# Patient Record
Sex: MALE | URBAN - METROPOLITAN AREA
[De-identification: names, ages, dates, MRNs, and addresses within clinical notes are randomized per-mention and may not be internally consistent; named-entity substitution may affect disease eponyms.]

---

## 2023-11-01 ENCOUNTER — APPOINTMENT (RX ONLY)
Dept: URBAN - METROPOLITAN AREA CLINIC 329 | Facility: CLINIC | Age: 63
Setting detail: DERMATOLOGY
End: 2023-11-01

## 2023-11-01 DIAGNOSIS — D485 NEOPLASM OF UNCERTAIN BEHAVIOR OF SKIN: ICD-10-CM

## 2023-11-01 PROBLEM — D48.5 NEOPLASM OF UNCERTAIN BEHAVIOR OF SKIN: Status: ACTIVE | Noted: 2023-11-01

## 2023-11-01 PROCEDURE — ? BIOPSY BY SHAVE METHOD

## 2023-11-01 PROCEDURE — 11102 TANGNTL BX SKIN SINGLE LES: CPT

## 2023-11-01 PROCEDURE — ? COUNSELING

## 2023-11-01 ASSESSMENT — LOCATION ZONE DERM: LOCATION ZONE: FACE

## 2023-11-01 ASSESSMENT — LOCATION DETAILED DESCRIPTION DERM: LOCATION DETAILED: LEFT INFERIOR LATERAL FOREHEAD

## 2023-11-01 ASSESSMENT — LOCATION SIMPLE DESCRIPTION DERM: LOCATION SIMPLE: LEFT FOREHEAD

## 2023-11-01 NOTE — PROCEDURE: MIPS QUALITY
Quality 110: Preventive Care And Screening: Influenza Immunization: Influenza Immunization Administered during Influenza season
Quality 431: Preventive Care And Screening: Unhealthy Alcohol Use - Screening: Patient not identified as an unhealthy alcohol user when screened for unhealthy alcohol use using a systematic screening method
Quality 226: Preventive Care And Screening: Tobacco Use: Screening And Cessation Intervention: Tobacco Screening not Performed
Detail Level: Zone

## 2023-11-01 NOTE — PROCEDURE: BIOPSY BY SHAVE METHOD
Detail Level: Detailed
Depth Of Biopsy: dermis
Was A Bandage Applied: Yes
Size Of Lesion In Cm: 1.5
X Size Of Lesion In Cm: 0
Biopsy Type: H and E
Biopsy Method: Dermablade
Anesthesia Type: 1% lidocaine with epinephrine
Anesthesia Volume In Cc: 0.5
Hemostasis: Drysol
Wound Care: Petrolatum
Dressing: bandage
Destruction After The Procedure: No
Type Of Destruction Used: Curettage
Curettage Text: The wound bed was treated with curettage after the biopsy was performed.
Cryotherapy Text: The wound bed was treated with cryotherapy after the biopsy was performed.
Electrodesiccation Text: The wound bed was treated with electrodesiccation after the biopsy was performed.
Electrodesiccation And Curettage Text: The wound bed was treated with electrodesiccation and curettage after the biopsy was performed.
Silver Nitrate Text: The wound bed was treated with silver nitrate after the biopsy was performed.
Lab: 6
Lab Facility: [id number]
Consent: Written consent was obtained and risks were reviewed including but not limited to scarring, infection, bleeding, scabbing, incomplete removal, nerve damage and allergy to anesthesia.
Post-Care Instructions: I reviewed with the patient in detail post-care instructions. Patient is to keep the biopsy site dry overnight, and then apply bacitracin twice daily until healed. Patient may apply hydrogen peroxide soaks to remove any crusting.
Notification Instructions: Patient will be notified of biopsy results. However, patient instructed to call the office if not contacted within 2 weeks.
Billing Type: Third-Party Bill
Information: Selecting Yes will display possible errors in your note based on the variables you have selected. This validation is only offered as a suggestion for you. PLEASE NOTE THAT THE VALIDATION TEXT WILL BE REMOVED WHEN YOU FINALIZE YOUR NOTE. IF YOU WANT TO FAX A PRELIMINARY NOTE YOU WILL NEED TO TOGGLE THIS TO 'NO' IF YOU DO NOT WANT IT IN YOUR FAXED NOTE.

## 2024-04-01 ENCOUNTER — OFFICE VISIT (OUTPATIENT)
Dept: ORTHOPEDIC SURGERY | Age: 64
End: 2024-04-01
Payer: COMMERCIAL

## 2024-04-01 VITALS — HEIGHT: 72 IN | BODY MASS INDEX: 35.21 KG/M2 | WEIGHT: 260 LBS

## 2024-04-01 DIAGNOSIS — M51.36 DDD (DEGENERATIVE DISC DISEASE), LUMBAR: ICD-10-CM

## 2024-04-01 DIAGNOSIS — M54.50 LOW BACK PAIN, UNSPECIFIED BACK PAIN LATERALITY, UNSPECIFIED CHRONICITY, UNSPECIFIED WHETHER SCIATICA PRESENT: Primary | ICD-10-CM

## 2024-04-01 DIAGNOSIS — M54.16 LUMBAR RADICULOPATHY: ICD-10-CM

## 2024-04-01 DIAGNOSIS — R29.818 NEUROGENIC CLAUDICATION: ICD-10-CM

## 2024-04-01 DIAGNOSIS — M47.816 FACET ARTHROPATHY, LUMBAR: ICD-10-CM

## 2024-04-01 PROCEDURE — 1123F ACP DISCUSS/DSCN MKR DOCD: CPT | Performed by: PHYSICIAN ASSISTANT

## 2024-04-01 PROCEDURE — 99204 OFFICE O/P NEW MOD 45 MIN: CPT | Performed by: PHYSICIAN ASSISTANT

## 2024-04-01 RX ORDER — MELOXICAM 15 MG/1
15 TABLET ORAL DAILY
Qty: 30 TABLET | Refills: 0 | Status: SHIPPED | OUTPATIENT
Start: 2024-04-01 | End: 2024-05-01

## 2024-04-01 RX ORDER — FLUTICASONE PROPIONATE 50 MCG
SPRAY, SUSPENSION (ML) NASAL
COMMUNITY

## 2024-04-01 RX ORDER — GABAPENTIN 100 MG/1
CAPSULE ORAL
COMMUNITY
End: 2024-04-01 | Stop reason: SDUPTHER

## 2024-04-01 RX ORDER — SILDENAFIL 25 MG/1
TABLET, FILM COATED ORAL
COMMUNITY

## 2024-04-01 RX ORDER — LORATADINE 10 MG/1
1 TABLET ORAL DAILY
COMMUNITY
Start: 2023-06-22

## 2024-04-01 RX ORDER — QUETIAPINE FUMARATE 50 MG/1
TABLET, FILM COATED ORAL
COMMUNITY

## 2024-04-01 RX ORDER — LOSARTAN POTASSIUM 100 MG/1
1 TABLET ORAL DAILY
COMMUNITY

## 2024-04-01 RX ORDER — CYCLOBENZAPRINE HCL 5 MG
TABLET ORAL
COMMUNITY

## 2024-04-01 RX ORDER — ATORVASTATIN CALCIUM 40 MG/1
1 TABLET, FILM COATED ORAL DAILY
COMMUNITY

## 2024-04-01 RX ORDER — GABAPENTIN 100 MG/1
200 CAPSULE ORAL 3 TIMES DAILY
Qty: 180 CAPSULE | Refills: 0 | Status: SHIPPED | OUTPATIENT
Start: 2024-04-01 | End: 2024-05-01

## 2024-04-01 RX ORDER — HYDROCHLOROTHIAZIDE 25 MG/1
TABLET ORAL
COMMUNITY
Start: 2023-10-24

## 2024-04-01 NOTE — PROGRESS NOTES
Name: Nacho Ashby  YOB: 1960  Gender: male  MRN: [id number]    CC: Back Pain (Low back pain that radiates into buttocks)       HPI: This is a 64 y.o. year old male who reports chronic history of lower back pain with radiation into bilateral buttocks posterior legs that usually occurs with standing and walking however he has had increase in particularly left lateral hip pain and buttock pain with radiation in the left lateral hip.  His PCP has prescribed cyclobenzaprine, meloxicam, gabapentin and ibuprofen.  He was advised not to take Beck back and body.  Pain is usually worse with standing and walking.  Denies  numbness or tingling.    History was obtained by patient    This patient  has not had lumbar surgery in the past.            4/1/2024    10:38 AM   AMB PAIN ASSESSMENT   Location of Pain Back    Location Modifiers Right;Left   Severity of Pain 9   Quality of Pain Aching;Sharp   Duration of Pain Persistent   Frequency of Pain Intermittent   Date Pain First Started 7/16/2004   Aggravating Factors Walking;Standing;Other (Comment)    Limiting Behavior Some   Relieving Factors Other (Comment);Nsaids    Result of Injury No   Work-Related Injury No   Are there other pain locations you wish to document? No       Significant value            ROS/Meds/PSH/PMH/FH/SH: I personally reviewed the patient's collected intake data.  Below are the pertinents:    No Known Allergies      Current Outpatient Medications:     atorvastatin (LIPITOR) 40 MG tablet, Take 1 tablet by mouth daily, Disp: , Rfl:     cyclobenzaprine (FLEXERIL) 5 MG tablet, Take 1 tablet twice a day by oral route as needed., Disp: , Rfl:     fluticasone (FLONASE) 50 MCG/ACT nasal spray, Spray 1 spray every day by intranasal route., Disp: , Rfl:     hydroCHLOROthiazide (HYDRODIURIL) 25 MG tablet, Take 1 tablet every day by oral route in the morning., Disp: , Rfl:     loratadine (CLARITIN) 10 MG tablet, Take 1 tablet by mouth daily, Disp:

## 2024-04-15 ENCOUNTER — HOSPITAL ENCOUNTER (OUTPATIENT)
Dept: PHYSICAL THERAPY | Age: 64
Setting detail: RECURRING SERIES
Discharge: HOME | End: 2024-04-18
Payer: MEDICARE

## 2024-04-15 DIAGNOSIS — R29.3 ABNORMAL POSTURE: Primary | ICD-10-CM

## 2024-04-15 DIAGNOSIS — M62.81 MUSCLE WEAKNESS (GENERALIZED): ICD-10-CM

## 2024-04-15 DIAGNOSIS — M54.59 OTHER LOW BACK PAIN: ICD-10-CM

## 2024-04-15 PROCEDURE — 97161 PT EVAL LOW COMPLEX 20 MIN: CPT

## 2024-04-15 PROCEDURE — 97530 THERAPEUTIC ACTIVITIES: CPT

## 2024-04-15 ASSESSMENT — PAIN SCALES - GENERAL: PAINLEVEL_OUTOF10: 3

## 2024-04-15 NOTE — THERAPY EVALUATION
WFL    Hip Internal Rotation Right: WNL  Left: WNL    Hip External Rotation Right: WNL  Left: WNL    Hamstring Length at 90* Hip Flexion Right: mild restrictions  Left: moderate restrictions    Piriformis Flexibility Right: significant restrictions  Left: significant restrictions      STRENGTH Date:   4/15/24 Date:      RIGHT LEFT RIGHT LEFT   Hip Flexion 5/5 5/5     Hip Extension 4+/5, initiates with lumbar extensors 4+/5, initiates with lumbar extensors     Hip Abduction 5/5 5/5     Hip Internal Rotation 5/5 5/5     Hip External Rotation 5/5 5/5     Knee Extension 5/5 5/5     Knee Flexion 5/5 5/5     Ankle Dorsiflexion 5/5 5/5     Ankle Plantarflexion 5/5 5/5       MOBILITY Date:   4/15/24 Date:    Bed mobility Rolling: Independent  Supine to Sit: Independent  Sit to Supine: IndependentIndependent  Scooting: Independent    Transfers Sit to Stand: Independent  Stand to Sit: Independent    Gait Gait Abnormalities: increased forward trunk lean and slower gait speed  Ambulation - Independent         FUNCTIONAL MOVEMENTS  MOBILITY Date:   4/15/24 Date:    Squatting Gait Abnormalities: NT  Goblet Squat: NT    Lunging Fwd Lunges with return to neutral: NT       Abdominal Activation/Strength TrA Firing with Hook Lying PPT: unable without significant cueing  TrA Firing with Hook Lying Marching: NT  Knee Fallouts in Hook Lying: NT  Modified Supine Leg Extensions: NT  SLR: NT  90/90 Holds: NT  90/90 Heel Taps: NT  Dead Bug (LE only): NT  Dead Bug (UE + LE): NT    Multifidus Firing with Prone Leg Lift Right: good  Left: fair      MISCELLANEOUS  Palpation: no point tenderness to lumbar region, TTP of bilateral piriformis mms bellies  Edema: non pitting at bilateral ankles     ASSESSMENT     INITIAL ASSESSMENT (4/15/24)  Nacho Ashby is a 64 y.o. male who presents to physical therapy for chronic low back pain with recent exacerbation of sx. This session, pt demonstrated decreased flexibility of bilateral hips and lumbar

## 2024-04-15 NOTE — PROGRESS NOTES
Nacho Ashby  : 1960  Primary: [employer] (Medicare Managed)  Secondary: [place]ICAID [place] [facility] [place] [address] [place] [address]  Phone: [phone]  Fax: [phone] Plan Frequency: 1x/week    Plan of Care/Certification Expiration Date: 24            Plan of Care/Certification Expiration Date:  Plan of Care/Certification Expiration Date: 24    Frequency/Duration:   Plan Frequency: 1x/week      Time In/Out:   Time In: 1517  Time Out: 1555      PT Visit Info:    Plan Frequency: 1x/week  Total # of Visits Approved: 0 (eval only+)  Progress Note Due Date: 05/15/24  Total # of Visits to Date: 1  Progress Note Counter: 1      Visit Count:  1    OUTPATIENT PHYSICAL THERAPY:   Treatment Note 4/15/2024       Charge Capture        Episode  (LBP)               Treatment Diagnosis:    Abnormal posture  Muscle weakness (generalized)  Other low back pain    Medical/Referring Diagnosis:   Low back pain, unspecified back pain laterality, unspecified chronicity, unspecified whether sciati*  DDD (degenerative disc disease), lumbar  Lumbar radiculopathy  Facet arthropathy, lumbar  Neurogenic claudication   Referring Physician: Laura Beckwith PA-C MD Orders: PT Eval and Treat   Return MD Appt:  24  Date of Onset: Onset Date:  (significantly worsened recently)    Allergies: Patient has no known allergies.    Restrictions/Precautions: None    Interventions Planned (Treatment may consist of any combination of the following): See Assessment Note      Subjective Comments: See Evaluation Note from today.    Initial Pain Level:     3/10   Post Session Pain Level:       3/10     Medications Last Reviewed: 4/15/2024    Updated Objective Findings: See Evaluation Note from today      Treatment     GAIT TRAINING: (0 minutes): Gait training to improve and/or restore physical functioning as related to mobility, strength, balance and coordination.

## 2024-04-22 ENCOUNTER — APPOINTMENT (OUTPATIENT)
Dept: PHYSICAL THERAPY | Age: 64
End: 2024-04-22
Payer: MEDICARE

## 2024-04-22 ENCOUNTER — HOSPITAL ENCOUNTER (OUTPATIENT)
Dept: PHYSICAL THERAPY | Age: 64
Setting detail: RECURRING SERIES
Discharge: HOME | End: 2024-04-25
Payer: COMMERCIAL

## 2024-04-22 PROCEDURE — 97110 THERAPEUTIC EXERCISES: CPT

## 2024-04-22 ASSESSMENT — PAIN SCALES - GENERAL: PAINLEVEL_OUTOF10: 6

## 2024-04-22 NOTE — PROGRESS NOTES
Nacho Ashby  : 1960  Primary: [employer] (Medicare Managed)  Secondary: [place] [facility] [place] [address] [place] [address]  Phone: [phone]  Fax: [phone] Plan Frequency: 1x/week    Plan of Care/Certification Expiration Date: 24            Plan of Care/Certification Expiration Date:  Plan of Care/Certification Expiration Date: 24    Frequency/Duration:   Plan Frequency: 1x/week      Time In/Out:   Time In: 1302  Time Out: 1345      PT Visit Info:    Plan Frequency: 1x/week  Total # of Visits Approved: 24  Progress Note Due Date: 05/15/24  Total # of Visits to Date: 2  Progress Note Counter: 2      Visit Count:  2    OUTPATIENT PHYSICAL THERAPY:   Treatment Note 2024       Charge Capture        Episode  (LBP)               Treatment Diagnosis:    Abnormal posture  Muscle weakness (generalized)  Other low back pain    Medical/Referring Diagnosis:   Low back pain, unspecified back pain laterality, unspecified chronicity, unspecified whether sciati*  DDD (degenerative disc disease), lumbar  Lumbar radiculopathy  Facet arthropathy, lumbar  Neurogenic claudication   Referring Physician: Laura Beckwith PA-C MD Orders: PT Eval and Treat   Return MD Appt:  24  Date of Onset: Onset Date:  (significantly worsened recently)    Allergies: Patient has no known allergies.    Restrictions/Precautions: None    Interventions Planned (Treatment may consist of any combination of the following): See Assessment Note      Subjective Comments: he is in a lot of pain today, pain is also in BLE to the toes. There is a mishap with his mail order pharmaceuticals that has not yet been resolved. He has continued with his exercises, they feel good for a little while but when he gets up and does something it is back again.    Initial Pain Level:     6/10   Post Session Pain Level:        (5-6)/10     Medications Last Reviewed:

## 2024-04-25 ENCOUNTER — TELEPHONE (OUTPATIENT)
Dept: ORTHOPEDIC SURGERY | Age: 64
End: 2024-04-25

## 2024-04-29 ENCOUNTER — OFFICE VISIT (OUTPATIENT)
Age: 64
End: 2024-04-29
Payer: MEDICARE

## 2024-04-29 DIAGNOSIS — M47.816 FACET ARTHROPATHY, LUMBAR: ICD-10-CM

## 2024-04-29 DIAGNOSIS — M51.36 DDD (DEGENERATIVE DISC DISEASE), LUMBAR: Primary | ICD-10-CM

## 2024-04-29 DIAGNOSIS — M54.16 LUMBAR RADICULOPATHY: ICD-10-CM

## 2024-04-29 DIAGNOSIS — M54.50 LOW BACK PAIN, UNSPECIFIED BACK PAIN LATERALITY, UNSPECIFIED CHRONICITY, UNSPECIFIED WHETHER SCIATICA PRESENT: ICD-10-CM

## 2024-04-29 DIAGNOSIS — R29.818 NEUROGENIC CLAUDICATION: ICD-10-CM

## 2024-04-29 PROCEDURE — G8417 CALC BMI ABV UP PARAM F/U: HCPCS | Performed by: PHYSICIAN ASSISTANT

## 2024-04-29 PROCEDURE — G8428 CUR MEDS NOT DOCUMENT: HCPCS | Performed by: PHYSICIAN ASSISTANT

## 2024-04-29 PROCEDURE — 99213 OFFICE O/P EST LOW 20 MIN: CPT | Performed by: PHYSICIAN ASSISTANT

## 2024-04-29 PROCEDURE — 3017F COLORECTAL CA SCREEN DOC REV: CPT | Performed by: PHYSICIAN ASSISTANT

## 2024-04-29 PROCEDURE — 1123F ACP DISCUSS/DSCN MKR DOCD: CPT | Performed by: PHYSICIAN ASSISTANT

## 2024-04-29 PROCEDURE — 4004F PT TOBACCO SCREEN RCVD TLK: CPT | Performed by: PHYSICIAN ASSISTANT

## 2024-04-29 RX ORDER — MELOXICAM 15 MG/1
15 TABLET ORAL DAILY
Qty: 30 TABLET | Refills: 0 | Status: SHIPPED | OUTPATIENT
Start: 2024-04-29 | End: 2024-05-29

## 2024-04-29 RX ORDER — GABAPENTIN 100 MG/1
200 CAPSULE ORAL 3 TIMES DAILY
Qty: 180 CAPSULE | Refills: 0 | Status: SHIPPED | OUTPATIENT
Start: 2024-04-29 | End: 2024-05-29

## 2024-04-29 NOTE — PROGRESS NOTES
An order for MRI of the Lumbar spine has been ordered.   
5 MG tablet, Take 1 tablet twice a day by oral route as needed., Disp: , Rfl:     fluticasone (FLONASE) 50 MCG/ACT nasal spray, Spray 1 spray every day by intranasal route., Disp: , Rfl:     hydroCHLOROthiazide (HYDRODIURIL) 25 MG tablet, Take 1 tablet every day by oral route in the morning., Disp: , Rfl:     loratadine (CLARITIN) 10 MG tablet, Take 1 tablet by mouth daily, Disp: , Rfl:     losartan (COZAAR) 100 MG tablet, Take 1 tablet by mouth daily, Disp: , Rfl:     QUEtiapine (SEROQUEL) 50 MG tablet, Take 1 tablet every day by oral route at bedtime., Disp: , Rfl:     sildenafil (VIAGRA) 25 MG tablet, Take 1 tablet by mouth AS NEEDED 30-45 mins prior to sexual activity, Disp: , Rfl:     gabapentin (NEURONTIN) 100 MG capsule, Take 2 capsules by mouth 3 times daily for 30 days., Disp: 180 capsule, Rfl: 0    meloxicam (MOBIC) 15 MG tablet, Take 1 tablet by mouth daily, Disp: 30 tablet, Rfl: 0    Past Surgical History:   Procedure Laterality Date    UPPER GASTROINTESTINAL ENDOSCOPY         There is no problem list on file for this patient.        Tobacco:  reports that he has been smoking cigarettes. He has never used smokeless tobacco.  Alcohol:   Social History     Substance and Sexual Activity   Alcohol Use Yes    Alcohol/week: 3.0 - 4.0 standard drinks of alcohol        Physical Exam:   @VS1@   GENERAL:  Adult in no acute distress, moderately obese Patient is appropriately conversant  MSK:  Examination of the lumbar spine reveals facet tenderness   There is moderate tenderness to palpation along the spinous processes and paraspinal musculature.   The patient ambulates with a forward flexed gait.    ROM of bilateral hip(s) reveals no irritability.   NEURO:  Cranial nerves grossly intact.  No motor deficits.    Straight leg testing is positive bilateral  Sensory testing reveals intact sensation to light touch and in the distribution of the L3-S1 dermatomes bilaterally  Ankle jerk is negative for

## 2024-04-29 NOTE — TELEPHONE ENCOUNTER
Patient is requesting a refill on meloxicam 15mg and gabapentin 100mg (patient says tid). Please send these in by escript or call them.

## 2024-05-08 ENCOUNTER — TELEPHONE (OUTPATIENT)
Dept: ORTHOPEDIC SURGERY | Age: 64
End: 2024-05-08

## 2024-05-08 NOTE — TELEPHONE ENCOUNTER
He is calling because he is out of gabapentin and meloxicam. He says "[employer] in pharmacy told him they had called or reached out three times to get approval to refill. He is asking for a call to let him know about refilling these. He is completely out of Meloxicam so he would like one sent to [facility] so he can get back on it.

## 2024-05-09 NOTE — TELEPHONE ENCOUNTER
Spoke to patient and discussed prescriptions and where they were sent.  According to his chart, Gabapentin and Meloxicam have been sent to [facility].  He is not out of the Gabapentin but out of Meloxicam.  He states he is currently on the way to [facility] to  the refill for Meloxicam.  He also states that he would like all refills sent to [facility] pharmacy from now on, so they can replenish them.  I did inform him that reviewing his MRI with him will help determine his diagnosis and treatment will be.  I explained that these will also help us decide if we will refill rx in the future.

## 2024-05-13 ENCOUNTER — OFFICE VISIT (OUTPATIENT)
Age: 64
End: 2024-05-13
Payer: MEDICARE

## 2024-05-13 DIAGNOSIS — M54.50 LOW BACK PAIN, UNSPECIFIED BACK PAIN LATERALITY, UNSPECIFIED CHRONICITY, UNSPECIFIED WHETHER SCIATICA PRESENT: ICD-10-CM

## 2024-05-13 DIAGNOSIS — R29.818 NEUROGENIC CLAUDICATION: ICD-10-CM

## 2024-05-13 DIAGNOSIS — M54.16 LUMBAR RADICULOPATHY: ICD-10-CM

## 2024-05-13 DIAGNOSIS — M48.061 FORAMINAL STENOSIS OF LUMBAR REGION: ICD-10-CM

## 2024-05-13 DIAGNOSIS — M51.36 DDD (DEGENERATIVE DISC DISEASE), LUMBAR: ICD-10-CM

## 2024-05-13 DIAGNOSIS — M47.816 FACET ARTHROPATHY, LUMBAR: Primary | ICD-10-CM

## 2024-05-13 PROCEDURE — 3017F COLORECTAL CA SCREEN DOC REV: CPT | Performed by: PHYSICIAN ASSISTANT

## 2024-05-13 PROCEDURE — 99214 OFFICE O/P EST MOD 30 MIN: CPT | Performed by: PHYSICIAN ASSISTANT

## 2024-05-13 PROCEDURE — G8427 DOCREV CUR MEDS BY ELIG CLIN: HCPCS | Performed by: PHYSICIAN ASSISTANT

## 2024-05-13 PROCEDURE — G8417 CALC BMI ABV UP PARAM F/U: HCPCS | Performed by: PHYSICIAN ASSISTANT

## 2024-05-13 PROCEDURE — 4004F PT TOBACCO SCREEN RCVD TLK: CPT | Performed by: PHYSICIAN ASSISTANT

## 2024-05-13 PROCEDURE — 1123F ACP DISCUSS/DSCN MKR DOCD: CPT | Performed by: PHYSICIAN ASSISTANT

## 2024-05-13 RX ORDER — GABAPENTIN 100 MG/1
200 CAPSULE ORAL 3 TIMES DAILY
Qty: 180 CAPSULE | Refills: 0 | Status: SHIPPED | OUTPATIENT
Start: 2024-05-13 | End: 2024-06-12

## 2024-05-13 NOTE — PROGRESS NOTES
Name: Nacho Ashby  YOB: 1960  Gender: male  MRN: [id number]    CC: Back Pain (MRI results)       HPI: This is a 64 y.o. year old male who reports chronic history of lower back pain with radiation into bilateral buttocks posterior legs that usually occurs with standing and walking however he has had increase in particularly left lateral hip pain and buttock pain with radiation in the left lateral hip.  His PCP has prescribed cyclobenzaprine, meloxicam, gabapentin and ibuprofen.  He was advised not to take Beck back and body.  Pain is usually worse with standing and walking.  Denies  numbness or tingling.    History was obtained by patient  His x-rays revealed multilevel degenerative changes and facet arthropathy.  We did refer him to physical therapy for flexibility and core strengthening exercises.  He has had 3 visits of physical therapy and reports the first 2 visits he felt good while he had the treatment but then the pain would return in the last visit exacerbated his pain he felt like he had sharp nails in his back.  He has had bilateral posterior leg pain that has been worse after PT.    We ordered MRI scan of the lumbar spine to evaluate for neurogenic compression that may be the source of his pain.  As mentioned before, he did not tolerate physical therapy.  He is on gabapentin and meloxicam.  Pain is primarily in the lower back he gets a \"vibration\" sensation bilateral lateral hips greater on the left.             4/1/2024    10:38 AM   AMB PAIN ASSESSMENT   Location of Pain Back    Location Modifiers Right;Left   Severity of Pain 9   Quality of Pain Aching;Sharp   Duration of Pain Persistent   Frequency of Pain Intermittent   Date Pain First Started 7/16/2004   Aggravating Factors Walking;Standing;Other (Comment)    Limiting Behavior Some   Relieving Factors Other (Comment);Nsaids    Result of Injury No   Work-Related Injury No   Are there other pain locations you wish to document?